# Patient Record
Sex: MALE | Race: WHITE | NOT HISPANIC OR LATINO
[De-identification: names, ages, dates, MRNs, and addresses within clinical notes are randomized per-mention and may not be internally consistent; named-entity substitution may affect disease eponyms.]

---

## 2018-10-18 ENCOUNTER — CHARTING TRANS (OUTPATIENT)
Dept: OTHER | Age: 28
End: 2018-10-18

## 2019-02-01 ENCOUNTER — WALK IN (OUTPATIENT)
Dept: URGENT CARE | Age: 29
End: 2019-02-01

## 2019-02-01 VITALS
HEIGHT: 74 IN | SYSTOLIC BLOOD PRESSURE: 118 MMHG | DIASTOLIC BLOOD PRESSURE: 74 MMHG | TEMPERATURE: 98.2 F | BODY MASS INDEX: 24.38 KG/M2 | RESPIRATION RATE: 15 BRPM | HEART RATE: 72 BPM | WEIGHT: 190 LBS

## 2019-02-01 DIAGNOSIS — J02.9 PHARYNGITIS, UNSPECIFIED ETIOLOGY: Primary | ICD-10-CM

## 2019-02-01 DIAGNOSIS — H66.003 ACUTE SUPPURATIVE OTITIS MEDIA OF BOTH EARS WITHOUT SPONTANEOUS RUPTURE OF TYMPANIC MEMBRANES, RECURRENCE NOT SPECIFIED: ICD-10-CM

## 2019-02-01 LAB
INTERNAL PROCEDURAL CONTROLS ACCEPTABLE: YES
S PYO AG THROAT QL IA.RAPID: NEGATIVE

## 2019-02-01 PROCEDURE — 99214 OFFICE O/P EST MOD 30 MIN: CPT | Performed by: NURSE PRACTITIONER

## 2019-02-01 PROCEDURE — 87880 STREP A ASSAY W/OPTIC: CPT | Performed by: NURSE PRACTITIONER

## 2019-02-01 RX ORDER — AMOXICILLIN 875 MG/1
875 TABLET, COATED ORAL 2 TIMES DAILY
Qty: 20 TABLET | Refills: 0 | Status: SHIPPED | OUTPATIENT
Start: 2019-02-01 | End: 2019-02-11

## 2019-02-01 ASSESSMENT — ENCOUNTER SYMPTOMS
WEAKNESS: 0
DIZZINESS: 0
CONSTIPATION: 0
FEVER: 0
SINUS PAIN: 0
SLEEP DISTURBANCE: 0
NAUSEA: 0
CONFUSION: 0
VOICE CHANGE: 0
EYE PAIN: 0
EYE DISCHARGE: 0
CHEST TIGHTNESS: 0
EYE REDNESS: 0
HEADACHES: 0
ACTIVITY CHANGE: 0
LIGHT-HEADEDNESS: 0
SORE THROAT: 1
DIARRHEA: 0
TROUBLE SWALLOWING: 0
COUGH: 0
APPETITE CHANGE: 0
EYE ITCHING: 0
SINUS PRESSURE: 0
SHORTNESS OF BREATH: 0
WHEEZING: 0
RHINORRHEA: 0
ADENOPATHY: 0
PHOTOPHOBIA: 0
VOMITING: 0
NERVOUS/ANXIOUS: 0
ABDOMINAL PAIN: 0
FATIGUE: 0

## 2021-08-09 ENCOUNTER — WALK IN (OUTPATIENT)
Dept: URGENT CARE | Age: 31
End: 2021-08-09

## 2021-08-09 VITALS
OXYGEN SATURATION: 99 % | SYSTOLIC BLOOD PRESSURE: 120 MMHG | HEART RATE: 74 BPM | RESPIRATION RATE: 20 BRPM | DIASTOLIC BLOOD PRESSURE: 80 MMHG | TEMPERATURE: 97.5 F

## 2021-08-09 DIAGNOSIS — J02.9 SORE THROAT: Primary | ICD-10-CM

## 2021-08-09 LAB
INTERNAL PROCEDURAL CONTROLS ACCEPTABLE: NO
S PYO AG THROAT QL IA.RAPID: NEGATIVE
SARS-COV+SARS-COV-2 AG RESP QL IA.RAPID: NOT DETECTED

## 2021-08-09 PROCEDURE — 87880 STREP A ASSAY W/OPTIC: CPT | Performed by: NURSE PRACTITIONER

## 2021-08-09 PROCEDURE — 87426 SARSCOV CORONAVIRUS AG IA: CPT | Performed by: NURSE PRACTITIONER

## 2021-08-09 PROCEDURE — 99213 OFFICE O/P EST LOW 20 MIN: CPT | Performed by: NURSE PRACTITIONER

## 2021-08-09 RX ORDER — LORATADINE 10 MG/1
10 TABLET ORAL DAILY
COMMUNITY

## 2021-08-09 ASSESSMENT — ENCOUNTER SYMPTOMS
FEVER: 0
SORE THROAT: 1
EYE REDNESS: 0
COUGH: 0
EYE DISCHARGE: 0

## 2022-11-17 ENCOUNTER — LAB ENCOUNTER (OUTPATIENT)
Dept: LAB | Age: 32
End: 2022-11-17
Attending: NURSE PRACTITIONER
Payer: COMMERCIAL

## 2022-11-17 ENCOUNTER — OFFICE VISIT (OUTPATIENT)
Dept: INTERNAL MEDICINE CLINIC | Facility: CLINIC | Age: 32
End: 2022-11-17
Payer: COMMERCIAL

## 2022-11-17 VITALS
HEIGHT: 75 IN | OXYGEN SATURATION: 97 % | TEMPERATURE: 98 F | BODY MASS INDEX: 24.62 KG/M2 | RESPIRATION RATE: 16 BRPM | WEIGHT: 198 LBS | SYSTOLIC BLOOD PRESSURE: 118 MMHG | HEART RATE: 80 BPM | DIASTOLIC BLOOD PRESSURE: 82 MMHG

## 2022-11-17 DIAGNOSIS — Z00.00 ANNUAL PHYSICAL EXAM: Primary | ICD-10-CM

## 2022-11-17 DIAGNOSIS — Z00.00 LABORATORY EXAMINATION ORDERED AS PART OF A ROUTINE GENERAL MEDICAL EXAMINATION: ICD-10-CM

## 2022-11-17 LAB
ALBUMIN SERPL-MCNC: 4.2 G/DL (ref 3.4–5)
ALBUMIN/GLOB SERPL: 1.6 {RATIO} (ref 1–2)
ALP LIVER SERPL-CCNC: 46 U/L
ALT SERPL-CCNC: 64 U/L
ANION GAP SERPL CALC-SCNC: 5 MMOL/L (ref 0–18)
AST SERPL-CCNC: 29 U/L (ref 15–37)
BASOPHILS # BLD AUTO: 0.03 X10(3) UL (ref 0–0.2)
BASOPHILS NFR BLD AUTO: 0.7 %
BILIRUB SERPL-MCNC: 0.9 MG/DL (ref 0.1–2)
BILIRUB UR QL STRIP.AUTO: NEGATIVE
BUN BLD-MCNC: 17 MG/DL (ref 7–18)
CALCIUM BLD-MCNC: 9.4 MG/DL (ref 8.5–10.1)
CHLORIDE SERPL-SCNC: 107 MMOL/L (ref 98–112)
CHOLEST SERPL-MCNC: 149 MG/DL (ref ?–200)
CLARITY UR REFRACT.AUTO: CLEAR
CO2 SERPL-SCNC: 29 MMOL/L (ref 21–32)
COLOR UR AUTO: YELLOW
CREAT BLD-MCNC: 1 MG/DL
EOSINOPHIL # BLD AUTO: 0.06 X10(3) UL (ref 0–0.7)
EOSINOPHIL NFR BLD AUTO: 1.5 %
ERYTHROCYTE [DISTWIDTH] IN BLOOD BY AUTOMATED COUNT: 11.9 %
EST. AVERAGE GLUCOSE BLD GHB EST-MCNC: 108 MG/DL (ref 68–126)
FASTING PATIENT LIPID ANSWER: YES
FASTING STATUS PATIENT QL REPORTED: YES
GFR SERPLBLD BASED ON 1.73 SQ M-ARVRAT: 102 ML/MIN/1.73M2 (ref 60–?)
GLOBULIN PLAS-MCNC: 2.7 G/DL (ref 2.8–4.4)
GLUCOSE BLD-MCNC: 85 MG/DL (ref 70–99)
GLUCOSE UR STRIP.AUTO-MCNC: NEGATIVE MG/DL
HBA1C MFR BLD: 5.4 % (ref ?–5.7)
HCT VFR BLD AUTO: 40.7 %
HDLC SERPL-MCNC: 49 MG/DL (ref 40–59)
HGB BLD-MCNC: 13.8 G/DL
IMM GRANULOCYTES # BLD AUTO: 0 X10(3) UL (ref 0–1)
IMM GRANULOCYTES NFR BLD: 0 %
KETONES UR STRIP.AUTO-MCNC: NEGATIVE MG/DL
LDLC SERPL CALC-MCNC: 88 MG/DL (ref ?–100)
LEUKOCYTE ESTERASE UR QL STRIP.AUTO: NEGATIVE
LYMPHOCYTES # BLD AUTO: 1.77 X10(3) UL (ref 1–4)
LYMPHOCYTES NFR BLD AUTO: 43.1 %
MCH RBC QN AUTO: 32.5 PG (ref 26–34)
MCHC RBC AUTO-ENTMCNC: 33.9 G/DL (ref 31–37)
MCV RBC AUTO: 96 FL
MONOCYTES # BLD AUTO: 0.43 X10(3) UL (ref 0.1–1)
MONOCYTES NFR BLD AUTO: 10.5 %
NEUTROPHILS # BLD AUTO: 1.82 X10 (3) UL (ref 1.5–7.7)
NEUTROPHILS # BLD AUTO: 1.82 X10(3) UL (ref 1.5–7.7)
NEUTROPHILS NFR BLD AUTO: 44.2 %
NITRITE UR QL STRIP.AUTO: NEGATIVE
NONHDLC SERPL-MCNC: 100 MG/DL (ref ?–130)
OSMOLALITY SERPL CALC.SUM OF ELEC: 293 MOSM/KG (ref 275–295)
PH UR STRIP.AUTO: 5 [PH] (ref 5–8)
PLATELET # BLD AUTO: 228 10(3)UL (ref 150–450)
POTASSIUM SERPL-SCNC: 4 MMOL/L (ref 3.5–5.1)
PROT SERPL-MCNC: 6.9 G/DL (ref 6.4–8.2)
PROT UR STRIP.AUTO-MCNC: NEGATIVE MG/DL
RBC # BLD AUTO: 4.24 X10(6)UL
RBC UR QL AUTO: NEGATIVE
SODIUM SERPL-SCNC: 141 MMOL/L (ref 136–145)
SP GR UR STRIP.AUTO: 1.02 (ref 1–1.03)
TRIGL SERPL-MCNC: 56 MG/DL (ref 30–149)
TSI SER-ACNC: 1.25 MIU/ML (ref 0.36–3.74)
UROBILINOGEN UR STRIP.AUTO-MCNC: <2 MG/DL
VLDLC SERPL CALC-MCNC: 9 MG/DL (ref 0–30)
WBC # BLD AUTO: 4.1 X10(3) UL (ref 4–11)

## 2022-11-17 PROCEDURE — 83036 HEMOGLOBIN GLYCOSYLATED A1C: CPT | Performed by: NURSE PRACTITIONER

## 2022-11-17 PROCEDURE — 3079F DIAST BP 80-89 MM HG: CPT | Performed by: NURSE PRACTITIONER

## 2022-11-17 PROCEDURE — 80061 LIPID PANEL: CPT | Performed by: NURSE PRACTITIONER

## 2022-11-17 PROCEDURE — 3008F BODY MASS INDEX DOCD: CPT | Performed by: NURSE PRACTITIONER

## 2022-11-17 PROCEDURE — 81003 URINALYSIS AUTO W/O SCOPE: CPT | Performed by: NURSE PRACTITIONER

## 2022-11-17 PROCEDURE — 3074F SYST BP LT 130 MM HG: CPT | Performed by: NURSE PRACTITIONER

## 2022-11-17 PROCEDURE — 99385 PREV VISIT NEW AGE 18-39: CPT | Performed by: NURSE PRACTITIONER

## 2022-11-17 PROCEDURE — 80050 GENERAL HEALTH PANEL: CPT | Performed by: NURSE PRACTITIONER

## 2024-08-06 ENCOUNTER — OFFICE VISIT (OUTPATIENT)
Dept: FAMILY MEDICINE CLINIC | Facility: CLINIC | Age: 34
End: 2024-08-06
Payer: COMMERCIAL

## 2024-08-06 VITALS
WEIGHT: 190 LBS | SYSTOLIC BLOOD PRESSURE: 120 MMHG | BODY MASS INDEX: 23.62 KG/M2 | HEIGHT: 75 IN | RESPIRATION RATE: 16 BRPM | TEMPERATURE: 97 F | HEART RATE: 68 BPM | OXYGEN SATURATION: 100 % | DIASTOLIC BLOOD PRESSURE: 74 MMHG
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
## 2024-08-06 DIAGNOSIS — L08.9 INFECTION OF SKIN, LOCAL: Primary | ICD-10-CM

## 2024-08-06 PROCEDURE — 99213 OFFICE O/P EST LOW 20 MIN: CPT | Performed by: NURSE PRACTITIONER

## 2024-08-06 RX ORDER — TRIAMCINOLONE ACETONIDE 1 MG/G
CREAM TOPICAL 2 TIMES DAILY PRN
Qty: 60 G | Refills: 0 | Status: SHIPPED | OUTPATIENT
Start: 2024-08-06

## 2024-08-06 RX ORDER — CEPHALEXIN 500 MG/1
500 CAPSULE ORAL 3 TIMES DAILY
Qty: 21 CAPSULE | Refills: 0 | Status: SHIPPED | OUTPATIENT
Start: 2024-08-06

## 2024-08-06 NOTE — PROGRESS NOTES
CHIEF COMPLAINT:     Chief Complaint   Patient presents with    Insect Bite     Bug bite on left arm, got bite on Saturday night, red and swelling to the area, itchy, also has a few on left leg       HPI:     Alek Urbina is a 34 year old male who presents with concerns of possible bug bite infection. Patient first noticed symptoms 3 days ago.  Reports has a few bug bites.  2 on his trunk and 1 on his left arm.  The left arm was so itchy the other night he was scratching it in his sleep.  The following morning there was a red bump.  It progressed to surrounding erythema and this morning he noticed streaking going up the arm.  Denies increased warmth, tenderness to palpation, or drainage from area.   Treatments: benadryl cream.  No other associated symptoms.  Denies fever or other signs of systemic illness.       Current Outpatient Medications   Medication Sig Dispense Refill    LORATADINE OR Take 1 tablet by mouth daily.      Multiple Vitamin (MULTIVITAMIN TAB/CAP) Take 1 tablet by mouth daily.        No past medical history on file.   Social History:  Social History     Socioeconomic History    Marital status:    Tobacco Use    Smoking status: Never    Smokeless tobacco: Never   Vaping Use    Vaping status: Never Used   Substance and Sexual Activity    Alcohol use: Not Currently    Drug use: Not Currently        REVIEW OF SYSTEMS:   GENERAL: feels well otherwise, no fever, no chills, normal appetite  SKIN: as above.  CHEST: no chest pains, no palpitations.  LUNGS: denies shortness of breath with exertion or rest. No wheezing, no cough.  LYMPH: no enlargement of the lymph nodes.  MUSC/SKEL: no joint swelling, no joint stiffness.  NEURO: no abnormal sensation, no tingling of the skin or numbness.    EXAM:   /74   Pulse 68   Temp 97.4 °F (36.3 °C)   Resp 16   Ht 6' 3\" (1.905 m)   Wt 190 lb (86.2 kg)   SpO2 100%   BMI 23.75 kg/m²     Physical Exam  Vitals reviewed.   Constitutional:        General: He is not in acute distress.     Appearance: Normal appearance. He is well-developed and well-groomed. He is not ill-appearing.   HENT:      Head: Normocephalic and atraumatic.      Right Ear: External ear normal.      Left Ear: External ear normal.      Nose: Nose normal.      Mouth/Throat:      Lips: Pink.   Eyes:      Extraocular Movements: Extraocular movements intact.      Conjunctiva/sclera: Conjunctivae normal.   Cardiovascular:      Rate and Rhythm: Normal rate and regular rhythm.      Heart sounds: Normal heart sounds. No murmur heard.  Pulmonary:      Effort: Pulmonary effort is normal.      Breath sounds: Normal breath sounds and air entry.   Musculoskeletal:      Cervical back: Neck supple.   Lymphadenopathy:      Upper Body:      Left upper body: No axillary or epitrochlear adenopathy.   Skin:     General: Skin is warm and dry.      Findings: Papular rash: See image.          Neurological:      Mental Status: He is alert.             ASSESSMENT AND PLAN:     ASSESSMENT:   Encounter Diagnoses   Name Primary?    Infection of skin, local Yes    Bug bite, initial encounter        PLAN:   Tetanus utd.  Comfort Care as listed in Patient Instructions.    Skin care discussed with patient.   Medication as below.    Requested Prescriptions     Signed Prescriptions Disp Refills    cephalexin 500 MG Oral Cap 21 capsule 0     Sig: Take 1 capsule (500 mg total) by mouth 3 (three) times daily.    triamcinolone 0.1 % External Cream 60 g 0     Sig: Apply topically 2 (two) times daily as needed.       Risks, benefits, side effects of medication explained and discussed.     The patient is asked to return in 3 days if sx's persist or worsen.  The patient indicates understanding of these issues and agrees to the plan.      Patient Instructions   Self-Care for Skin Rashes   A rash is your skin’s reaction to a substance your body is sensitive to. Most rashes can be treated at home by keeping the skin clean and dry.  But some rashes may be signs of a more serious problem. Call your doctor if you notice other symptoms with your rash, or if the rash is getting worse.   Common Causes of Rashes   Too much exposure to the sun   An allergic reaction to a food (shrimp), plant (poison ivy), or chemical (strong detergent)   An infection caused by a fungus (ringworm), virus (chickenpox), or bacteria (strep)   Bites or infestation due to insects or pests, such as ticks, lice, or mites  Control Itching and Skin Damage   Take soothing baths. Try 1 cup of oatmeal or baking soda in a tub of warm (not hot) water.   Do your best not to scratch. And clip your fingernails to reduce skin damage if you do scratch.  Keep Your Skin Clean and Dry   Wash with mild, nonirritating soap and warm water.   Wear clothing that breathes, such as cotton shirts or canvas shoes.   If fluid is seeping from the rash, cover it loosely with clean gauze to absorb the discharge.   Many rashes are contagious. Prevent the rash from spreading to others by not sharing towels.  Use Medication   Take antihistamines to block your body’s reaction to the substance causing the rash. (Claritin, Zyrtec or Benadryl)  Use the medication prescribed today  Follow Up  Most rashes should begin to improve within 72 hours of treatment, if no improvement follow up.

## 2025-04-03 ENCOUNTER — OFFICE VISIT (OUTPATIENT)
Dept: FAMILY MEDICINE CLINIC | Facility: CLINIC | Age: 35
End: 2025-04-03
Payer: COMMERCIAL

## 2025-04-03 ENCOUNTER — LAB ENCOUNTER (OUTPATIENT)
Dept: LAB | Facility: REFERENCE LAB | Age: 35
End: 2025-04-03
Attending: STUDENT IN AN ORGANIZED HEALTH CARE EDUCATION/TRAINING PROGRAM
Payer: COMMERCIAL

## 2025-04-03 VITALS
HEIGHT: 75 IN | HEART RATE: 80 BPM | SYSTOLIC BLOOD PRESSURE: 122 MMHG | OXYGEN SATURATION: 98 % | BODY MASS INDEX: 24.35 KG/M2 | DIASTOLIC BLOOD PRESSURE: 80 MMHG | TEMPERATURE: 98 F | WEIGHT: 195.81 LBS

## 2025-04-03 DIAGNOSIS — Z00.00 ROUTINE MEDICAL EXAM: ICD-10-CM

## 2025-04-03 DIAGNOSIS — Z00.00 ROUTINE MEDICAL EXAM: Primary | ICD-10-CM

## 2025-04-03 LAB
ALBUMIN SERPL-MCNC: 4.6 G/DL (ref 3.2–4.8)
ALBUMIN/GLOB SERPL: 2 {RATIO} (ref 1–2)
ALP LIVER SERPL-CCNC: 46 U/L
ALT SERPL-CCNC: 17 U/L
ANION GAP SERPL CALC-SCNC: 6 MMOL/L (ref 0–18)
AST SERPL-CCNC: 21 U/L (ref ?–34)
BILIRUB SERPL-MCNC: 0.7 MG/DL (ref 0.3–1.2)
BUN BLD-MCNC: 13 MG/DL (ref 9–23)
BUN/CREAT SERPL: 11.1 (ref 10–20)
CALCIUM BLD-MCNC: 9.4 MG/DL (ref 8.7–10.4)
CHLORIDE SERPL-SCNC: 104 MMOL/L (ref 98–112)
CHOLEST SERPL-MCNC: 167 MG/DL (ref ?–200)
CO2 SERPL-SCNC: 30 MMOL/L (ref 21–32)
CREAT BLD-MCNC: 1.17 MG/DL
DEPRECATED RDW RBC AUTO: 38.9 FL (ref 35.1–46.3)
EGFRCR SERPLBLD CKD-EPI 2021: 83 ML/MIN/1.73M2 (ref 60–?)
ERYTHROCYTE [DISTWIDTH] IN BLOOD BY AUTOMATED COUNT: 11.9 % (ref 11–15)
EST. AVERAGE GLUCOSE BLD GHB EST-MCNC: 100 MG/DL (ref 68–126)
FASTING PATIENT LIPID ANSWER: YES
FASTING STATUS PATIENT QL REPORTED: YES
GLOBULIN PLAS-MCNC: 2.3 G/DL (ref 2–3.5)
GLUCOSE BLD-MCNC: 101 MG/DL (ref 70–99)
HBA1C MFR BLD: 5.1 % (ref ?–5.7)
HCT VFR BLD AUTO: 42.6 %
HDLC SERPL-MCNC: 44 MG/DL (ref 40–59)
HGB BLD-MCNC: 14.8 G/DL
LDLC SERPL CALC-MCNC: 112 MG/DL (ref ?–100)
MCH RBC QN AUTO: 31.6 PG (ref 26–34)
MCHC RBC AUTO-ENTMCNC: 34.7 G/DL (ref 31–37)
MCV RBC AUTO: 90.8 FL
NONHDLC SERPL-MCNC: 123 MG/DL (ref ?–130)
OSMOLALITY SERPL CALC.SUM OF ELEC: 290 MOSM/KG (ref 275–295)
PLATELET # BLD AUTO: 253 10(3)UL (ref 150–450)
POTASSIUM SERPL-SCNC: 4.5 MMOL/L (ref 3.5–5.1)
PROT SERPL-MCNC: 6.9 G/DL (ref 5.7–8.2)
RBC # BLD AUTO: 4.69 X10(6)UL
SODIUM SERPL-SCNC: 140 MMOL/L (ref 136–145)
TRIGL SERPL-MCNC: 55 MG/DL (ref 30–149)
TSI SER-ACNC: 1.88 UIU/ML (ref 0.55–4.78)
VLDLC SERPL CALC-MCNC: 9 MG/DL (ref 0–30)
WBC # BLD AUTO: 4 X10(3) UL (ref 4–11)

## 2025-04-03 PROCEDURE — 80061 LIPID PANEL: CPT

## 2025-04-03 PROCEDURE — 85027 COMPLETE CBC AUTOMATED: CPT

## 2025-04-03 PROCEDURE — 80053 COMPREHEN METABOLIC PANEL: CPT

## 2025-04-03 PROCEDURE — 36415 COLL VENOUS BLD VENIPUNCTURE: CPT

## 2025-04-03 PROCEDURE — 99385 PREV VISIT NEW AGE 18-39: CPT | Performed by: STUDENT IN AN ORGANIZED HEALTH CARE EDUCATION/TRAINING PROGRAM

## 2025-04-03 PROCEDURE — 83036 HEMOGLOBIN GLYCOSYLATED A1C: CPT

## 2025-04-03 PROCEDURE — 84443 ASSAY THYROID STIM HORMONE: CPT

## 2025-04-03 NOTE — PROGRESS NOTES
Subjective:   Alek Urbina is a 34 year old male who presents for Physical     34-year-old male coming into establish care and for routine physical.  Patient has no acute complaints.  States that he is trying to keep an eye on his blood pressure given that there is family history of hypertension.  Overall pretty active and works out regularly as he is a .  Notes that he can improve his diet.  Denies family history of colon cancer.  Denies concern for STDs.    History/Other:    Chief Complaint Reviewed and Verified  No Further Nursing Notes to   Review  Tobacco Reviewed  Allergies Reviewed  Medications Reviewed    Problem List Reviewed  Medical History Reviewed  Surgical History   Reviewed  Family History Reviewed  Social History Reviewed         Tobacco:  He has never smoked tobacco.    No current outpatient medications on file.         Review of Systems:  Review of Systems   Constitutional:  Negative for chills, diaphoresis and fever.   HENT:  Negative for congestion, ear discharge, ear pain, sinus pressure, sinus pain and sore throat.    Eyes:  Negative for pain and discharge.   Respiratory:  Negative for cough, chest tightness, shortness of breath and wheezing.    Cardiovascular:  Negative for chest pain and palpitations.   Gastrointestinal:  Negative for abdominal pain, diarrhea, nausea and vomiting.   Endocrine: Negative for cold intolerance and heat intolerance.   Genitourinary:  Negative for dysuria, flank pain, frequency and urgency.   Musculoskeletal:  Negative for joint swelling.   Skin:  Negative for rash.   Neurological:  Negative for dizziness, syncope and headaches.   Psychiatric/Behavioral:  Negative for confusion and hallucinations.        Objective:   /80   Pulse 80   Temp 97.8 °F (36.6 °C)   Ht 6' 3\" (1.905 m)   Wt 195 lb 12.8 oz (88.8 kg)   SpO2 98%   BMI 24.47 kg/m²  Estimated body mass index is 24.47 kg/m² as calculated from the following:    Height as of  this encounter: 6' 3\" (1.905 m).    Weight as of this encounter: 195 lb 12.8 oz (88.8 kg).  Physical Exam  Constitutional:       General: He is not in acute distress.     Appearance: Normal appearance. He is not ill-appearing or toxic-appearing.   HENT:      Head: Normocephalic and atraumatic.      Right Ear: Tympanic membrane and ear canal normal.      Left Ear: Tympanic membrane and ear canal normal.      Mouth/Throat:      Mouth: Mucous membranes are moist.      Pharynx: Oropharynx is clear. No oropharyngeal exudate or posterior oropharyngeal erythema.   Eyes:      Extraocular Movements: Extraocular movements intact.      Pupils: Pupils are equal, round, and reactive to light.   Cardiovascular:      Rate and Rhythm: Normal rate and regular rhythm.      Heart sounds: Normal heart sounds. No murmur heard.     No gallop.   Pulmonary:      Effort: Pulmonary effort is normal. No respiratory distress.      Breath sounds: Normal breath sounds. No stridor. No wheezing, rhonchi or rales.   Abdominal:      General: Bowel sounds are normal.      Palpations: Abdomen is soft.      Tenderness: There is no abdominal tenderness. There is no right CVA tenderness, left CVA tenderness or guarding.   Musculoskeletal:         General: No swelling.      Cervical back: Normal range of motion and neck supple. No rigidity or tenderness.      Right lower leg: No edema.      Left lower leg: No edema.   Skin:     General: Skin is warm and dry.   Neurological:      General: No focal deficit present.      Mental Status: He is alert and oriented to person, place, and time. Mental status is at baseline.      Cranial Nerves: No cranial nerve deficit.      Sensory: No sensory deficit.      Motor: Motor function is intact. No weakness.      Gait: Gait normal.   Psychiatric:         Mood and Affect: Mood normal.         Behavior: Behavior normal.         Thought Content: Thought content normal.         Judgment: Judgment normal.         Assessment  & Plan:   1. Routine medical exam (Primary)  -Healthy diet and lifestyle.  -Exercise as tolerated.  -Dental exam every 6 months or as recommended by dentist.  -Eye exams annually, at least every 2 years or as recommended by specialist.  -     CBC, Platelet; No Differential; Future; Expected date: 04/03/2025  -     Comp Metabolic Panel (14); Future; Expected date: 04/03/2025  -     Hemoglobin A1C; Future; Expected date: 04/03/2025  -     Lipid Panel; Future; Expected date: 04/03/2025  -     TSH W Reflex To Free T4; Future; Expected date: 04/03/2025        Return in about 1 year (around 4/3/2026) for Routine physical exam visit.    Miguel Bush MD, 4/3/2025, 8:26 AM

## 2025-05-01 ENCOUNTER — OFFICE VISIT (OUTPATIENT)
Dept: DERMATOLOGY CLINIC | Facility: CLINIC | Age: 35
End: 2025-05-01
Payer: COMMERCIAL

## 2025-05-01 DIAGNOSIS — L82.1 SEBORRHEIC KERATOSES: ICD-10-CM

## 2025-05-01 DIAGNOSIS — L81.4 LENTIGINES: Primary | ICD-10-CM

## 2025-05-01 DIAGNOSIS — D18.01 CHERRY ANGIOMA: ICD-10-CM

## 2025-05-01 DIAGNOSIS — B07.9 VERRUCA VULGARIS: ICD-10-CM

## 2025-05-01 DIAGNOSIS — D22.9 MULTIPLE BENIGN NEVI: ICD-10-CM

## 2025-05-01 PROCEDURE — 17110 DESTRUCTION B9 LES UP TO 14: CPT | Performed by: STUDENT IN AN ORGANIZED HEALTH CARE EDUCATION/TRAINING PROGRAM

## 2025-05-01 PROCEDURE — 99204 OFFICE O/P NEW MOD 45 MIN: CPT | Performed by: STUDENT IN AN ORGANIZED HEALTH CARE EDUCATION/TRAINING PROGRAM

## 2025-05-01 NOTE — PROGRESS NOTES
New patient     Referred by:   No referring provider defined for this encounter.     CHIEF COMPLAINT: FBSE    HISTORY OF PRESENT ILLNESS: .    1. Mole  Location: Rt foot 4th digit   Duration: over 20 yrs  Bleeding, growing, changing?: No  Scaly?:No    Itchy?:No    Current treatment: None  Past treatments: None    2. Plantar Wart  Location: Left foot   Duration: Many yrs but started to bother during exercise 6 mths ago  Bleeding, growing, changing?: Yes: growing  Scaly?:No    Itchy?:No    Current treatment: None  Past treatments: Dr. Smyth 6 mths ago but didn't work        DERM HISTORY:  History of skin cancer: No  History of melanoma: No    FAMILY HISTORY:  History of melanoma: No; Mom/Dad had BCC    PAST MEDICAL HISTORY:  Past Medical History[1]    REVIEW OF SYSTEMS:  Constitutional: Denies fever, chills, unintentional weight loss.   Skin as per HPI    Medications  Current Medications[2]    PHYSICAL EXAM:  Patient declined chaperone   General: awake, alert, no acute distress  Skin: Skin exam was performed today including the following: head and face, scalp, neck, chest (including breasts and axillae), abdomen, back, bilateral upper extremities, bilateral lower extremities, hands, feet, digits, nails. Pertinent findings include:   - Scattered bright red-purple dome-shaped papules on the trunk and extremities   - Scattered light brown stellate macules on sun exposed sites  - Scattered, evenly colored, round brown macules and papules with regular borders on the trunk and extremities  - Numerous scattered skin-colored and brown, waxy, stuck-on papules and plaques on the trunk and extremities  - L foot with a Verrucous papule    ASSESSMENT & PLAN:  Pathophysiology of diagnoses discussed with patient.  Therapeutic options reviewed. Risks, benefits, and alternatives discussed with patient. Instructions reviewed at length.    #Lentigines  #Seborrheic keratoses   #Cherry angiomas   - Reassurance provided regarding the  benign nature of these lesions.    #Multiple benign nevi  - Complete skin exam performed today with no outlier lesions identified   - Reassured patient of benign nature of these lesions.   - Recommend daily photoprotection with broad-spectrum sunscreen, avoidance of sun during peak hours, and sun protective clothing.    - Dermoscopy was used for physical examination of pigmented lesions during today's office visit.    #Verruca vulgaris  - Recommend OTC compound W  - Patient elected cryotherapy today     - Cryosurgery of non-malignant lesion(s)    Risks, benefits, alternatives and personnel required for cryosurgery reviewed with patient. Possible adverse effects reviewed including, but not limited to: redness, swelling, blister formation, postinflammatory pigment alteration, ring wart formation, scarring, recurrence. Pt verbalizes understanding and wishes to proceed.     Cryosurgery performed with Liquid Nitrogen via cryostat spray gun to warts. 1 lesion(s) treated.     Patient tolerated well.      Return to clinic:  6 weeks wart f/u  or sooner if something concerning arises     Elias Suarez MD    By signing my name below, IFlaca MA,  attest that this documentation has been prepared under the direction and in the presence of Elias Suarez MD.   Electronically Signed: Flaca LANDRY MA, 5/1/2025, 11:00 AM.    IElias MD,  personally performed the services described in this documentation. All medical record entries made by the scribe were at my direction and in my presence.  I have reviewed the chart and agree that the record reflects my personal performance and is accurate and complete.  Elias Suarez MD, 5/1/2025, 12:25 PM             [1] No past medical history on file.  [2]   No current outpatient medications on file.

## 2025-06-12 ENCOUNTER — OFFICE VISIT (OUTPATIENT)
Dept: DERMATOLOGY CLINIC | Facility: CLINIC | Age: 35
End: 2025-06-12
Payer: COMMERCIAL

## 2025-06-12 DIAGNOSIS — B07.9 VERRUCA VULGARIS: Primary | ICD-10-CM

## 2025-06-12 PROCEDURE — 17110 DESTRUCTION B9 LES UP TO 14: CPT | Performed by: STUDENT IN AN ORGANIZED HEALTH CARE EDUCATION/TRAINING PROGRAM

## 2025-06-12 PROCEDURE — 99214 OFFICE O/P EST MOD 30 MIN: CPT | Performed by: STUDENT IN AN ORGANIZED HEALTH CARE EDUCATION/TRAINING PROGRAM

## 2025-06-12 NOTE — PROGRESS NOTES
Established Patient    Referred by:   No referring provider defined for this encounter.     CHIEF COMPLAINT: FBSE    HISTORY OF PRESENT ILLNESS: .      1. Plantar Wart  Location: Left foot   Duration: Many yrs but started to bother during exercise 6 mths ago  Bleeding, growing, changing?: Pt has seen 50% improvement but still present. Pain upon standing without shoes.   Scaly?:No    Itchy?:No    Current treatment: Compound W every night  Past treatments: Dr. Smyth 6 mths ago but didn't work    DERM HISTORY:  History of skin cancer: No  History of melanoma: No    FAMILY HISTORY:  History of melanoma: No; Mom/Dad had BCC    PAST MEDICAL HISTORY:  Past Medical History[1]    REVIEW OF SYSTEMS:  Constitutional: Denies fever, chills, unintentional weight loss.   Skin as per HPI    Medications  Current Medications[2]    PHYSICAL EXAM:  Patient declined chaperone   General: awake, alert, no acute distress  Skin: Skin exam was performed today including the following: foot. Pertinent findings include:   - L foot with a Verrucous papule    ASSESSMENT & PLAN:  Pathophysiology of diagnoses discussed with patient.  Therapeutic options reviewed. Risks, benefits, and alternatives discussed with patient. Instructions reviewed at length.    #Verruca vulgaris  - Continue OTC compound W  - Patient elected cryotherapy today     - Cryosurgery of non-malignant lesion(s)    Risks, benefits, alternatives and personnel required for cryosurgery reviewed with patient. Possible adverse effects reviewed including, but not limited to: redness, swelling, blister formation, postinflammatory pigment alteration, ring wart formation, scarring, recurrence. Pt verbalizes understanding and wishes to proceed.     Cryosurgery performed with Liquid Nitrogen via cryostat spray gun to warts. 1 lesion(s) treated.     Patient tolerated well.      Return to clinic: 6 weeks wart f/u or sooner if something concerning arises     Elias Suarez MD    By signing  my name below, I, Flaca LANDRY MA,  attest that this documentation has been prepared under the direction and in the presence of Elias Suarez MD.   Electronically Signed: Flaca LANDRY MA, 6/12/2025, 11:39 AM.    I, Elias Suarez MD,  personally performed the services described in this documentation. All medical record entries made by the scribe were at my direction and in my presence.  I have reviewed the chart and agree that the record reflects my personal performance and is accurate and complete.  Elias Suarez MD, 6/12/2025, 12:10 PM               [1] No past medical history on file.  [2]   No current outpatient medications on file.

## 2025-07-24 ENCOUNTER — OFFICE VISIT (OUTPATIENT)
Dept: DERMATOLOGY CLINIC | Facility: CLINIC | Age: 35
End: 2025-07-24
Payer: COMMERCIAL

## 2025-07-24 DIAGNOSIS — L85.9 HYPERKERATOSIS: ICD-10-CM

## 2025-07-24 DIAGNOSIS — B07.9 VERRUCA VULGARIS: Primary | ICD-10-CM

## 2025-07-24 PROCEDURE — 99214 OFFICE O/P EST MOD 30 MIN: CPT | Performed by: STUDENT IN AN ORGANIZED HEALTH CARE EDUCATION/TRAINING PROGRAM

## 2025-07-24 PROCEDURE — 17110 DESTRUCTION B9 LES UP TO 14: CPT | Performed by: STUDENT IN AN ORGANIZED HEALTH CARE EDUCATION/TRAINING PROGRAM

## 2025-07-24 RX ORDER — AMMONIUM LACTATE 12 G/100G
1 LOTION TOPICAL 2 TIMES DAILY
Qty: 225 G | Refills: 11 | Status: SHIPPED | OUTPATIENT
Start: 2025-07-24 | End: 2025-08-23

## 2025-07-24 NOTE — PROGRESS NOTES
Established Patient    Referred by:   No referring provider defined for this encounter.     CHIEF COMPLAINT: 6 Wk F/U    HISTORY OF PRESENT ILLNESS: .      1. Plantar Wart  Location: Left foot   Duration: Many yrs but started to bother during exercise 6 mths ago  Bleeding, growing, changing?: Pt has seen 50% improvement but still present. Pain has gone away when standing without shoes  Scaly?:No    Itchy?:No    Current treatment: Cryotherapy at [facility]. Compound W every night but skipped past 3 nights.   Past treatments: Dr. Smyth 6 mths ago but didn't work    DERM HISTORY:  History of skin cancer: No  History of melanoma: No    FAMILY HISTORY:  History of melanoma: No; Mom/Dad had BCC    PAST MEDICAL HISTORY:  Past Medical History[1]    REVIEW OF SYSTEMS:  Constitutional: Denies fever, chills, unintentional weight loss.   Skin as per HPI    Medications  Current Medications[2]    PHYSICAL EXAM:  Patient declined chaperone   General: awake, alert, no acute distress  Skin: Skin exam was performed today including the following: foot. Pertinent findings include:   - L foot with a Verrucous papule  - L heel with a hyperkeratotic plaque    ASSESSMENT & PLAN:  Pathophysiology of diagnoses discussed with patient.  Therapeutic options reviewed. Risks, benefits, and alternatives discussed with patient. Instructions reviewed at length.    #Hyperkeratosis  - Start lac-hydrin twice daily to affected areas     #Verruca vulgaris  - Continue OTC compound W  - Patient elected cryotherapy today     - Cryosurgery of non-malignant lesion(s)    Risks, benefits, alternatives and personnel required for cryosurgery reviewed with patient. Possible adverse effects reviewed including, but not limited to: redness, swelling, blister formation, postinflammatory pigment alteration, ring wart formation, scarring, recurrence. Pt verbalizes understanding and wishes to proceed.     Cryosurgery performed with Liquid Nitrogen via cryostat spray gun to  warts. 1 lesion(s) treated.     Patient tolerated well.      Return to clinic: 6 weeks wart f/u or sooner if something concerning arises     Elias Suarez MD    By signing my name below, I, Flaca LANDRY MA,  attest that this documentation has been prepared under the direction and in the presence of Elias Suarez MD.   Electronically Signed: Flaca LANDRY MA, 7/24/2025, 11:43 AM.                 [1] No past medical history on file.  [2]   No current outpatient medications on file.

## 2025-07-28 ENCOUNTER — TELEPHONE (OUTPATIENT)
Dept: DERMATOLOGY CLINIC | Facility: CLINIC | Age: 35
End: 2025-07-28

## 2025-07-28 NOTE — TELEPHONE ENCOUNTER
Medication PA Requested:   ammonium lactate 12 % External Lotion                                                        CoverMyMeds Used:  Key:  Quantity: 225gm  Day Supply: 30  Sig: Apply 1 Application topically 2 (two) times daily for 60 doses. Use twice daily . Take breaks if experiencing burning   DX Code:  L85.9                                   CPT code (if applicable):   Case Number/Pending Ref#: